# Patient Record
Sex: MALE | Race: WHITE | URBAN - METROPOLITAN AREA
[De-identification: names, ages, dates, MRNs, and addresses within clinical notes are randomized per-mention and may not be internally consistent; named-entity substitution may affect disease eponyms.]

---

## 2017-07-05 ENCOUNTER — FOLLOW UP ESTABLISHED (OUTPATIENT)
Dept: URBAN - METROPOLITAN AREA CLINIC 24 | Facility: CLINIC | Age: 77
End: 2017-07-05
Payer: MEDICARE

## 2017-07-05 PROCEDURE — 92014 COMPRE OPH EXAM EST PT 1/>: CPT | Performed by: OPTOMETRIST

## 2017-07-05 ASSESSMENT — INTRAOCULAR PRESSURE
OD: 11
OS: 12

## 2017-07-05 ASSESSMENT — VISUAL ACUITY
OS: 20/20
OD: 20/20

## 2017-07-05 ASSESSMENT — KERATOMETRY
OS: 40.98
OD: 40.58

## 2018-07-30 ENCOUNTER — FOLLOW UP ESTABLISHED (OUTPATIENT)
Dept: URBAN - METROPOLITAN AREA CLINIC 24 | Facility: CLINIC | Age: 78
End: 2018-07-30
Payer: MEDICARE

## 2018-07-30 PROCEDURE — 92014 COMPRE OPH EXAM EST PT 1/>: CPT | Performed by: OPTOMETRIST

## 2018-07-30 ASSESSMENT — INTRAOCULAR PRESSURE
OS: 14
OD: 12

## 2018-07-30 ASSESSMENT — VISUAL ACUITY
OD: 20/25
OS: 20/20

## 2019-08-01 ENCOUNTER — FOLLOW UP ESTABLISHED (OUTPATIENT)
Dept: URBAN - METROPOLITAN AREA CLINIC 24 | Facility: CLINIC | Age: 79
End: 2019-08-01
Payer: MEDICARE

## 2019-08-01 DIAGNOSIS — H43.813 VITREOUS DEGENERATION, BILATERAL: ICD-10-CM

## 2019-08-01 PROCEDURE — 92134 CPTRZ OPH DX IMG PST SGM RTA: CPT | Performed by: OPTOMETRIST

## 2019-08-01 PROCEDURE — 92014 COMPRE OPH EXAM EST PT 1/>: CPT | Performed by: OPTOMETRIST

## 2019-08-01 ASSESSMENT — INTRAOCULAR PRESSURE
OD: 12
OS: 11

## 2019-08-01 ASSESSMENT — VISUAL ACUITY
OS: 20/20
OD: 20/20

## 2020-10-16 ENCOUNTER — FOLLOW UP ESTABLISHED (OUTPATIENT)
Dept: URBAN - METROPOLITAN AREA CLINIC 24 | Facility: CLINIC | Age: 80
End: 2020-10-16
Payer: MEDICARE

## 2020-10-16 DIAGNOSIS — H25.813 COMBINED FORMS OF AGE-RELATED CATARACT, BILATERAL: Primary | ICD-10-CM

## 2020-10-16 PROCEDURE — 92134 CPTRZ OPH DX IMG PST SGM RTA: CPT | Performed by: OPTOMETRIST

## 2020-10-16 PROCEDURE — 92014 COMPRE OPH EXAM EST PT 1/>: CPT | Performed by: OPTOMETRIST

## 2020-10-16 ASSESSMENT — KERATOMETRY
OD: 40.52
OS: 40.91

## 2020-10-16 ASSESSMENT — VISUAL ACUITY
OD: 20/20
OS: 20/20

## 2020-10-16 ASSESSMENT — INTRAOCULAR PRESSURE
OD: 10
OS: 8

## 2021-11-30 ENCOUNTER — OFFICE VISIT (OUTPATIENT)
Dept: URBAN - METROPOLITAN AREA CLINIC 24 | Facility: CLINIC | Age: 81
End: 2021-11-30
Payer: MEDICARE

## 2021-11-30 DIAGNOSIS — H52.03 HYPERMETROPIA, BILATERAL: ICD-10-CM

## 2021-11-30 DIAGNOSIS — H35.372 PUCKERING OF MACULA, LEFT EYE: ICD-10-CM

## 2021-11-30 PROCEDURE — 92134 CPTRZ OPH DX IMG PST SGM RTA: CPT | Performed by: OPTOMETRIST

## 2021-11-30 PROCEDURE — 99214 OFFICE O/P EST MOD 30 MIN: CPT | Performed by: OPTOMETRIST

## 2021-11-30 ASSESSMENT — VISUAL ACUITY
OD: 20/20
OS: 20/25

## 2021-11-30 ASSESSMENT — KERATOMETRY
OD: 40.51
OS: 41.09

## 2021-11-30 ASSESSMENT — INTRAOCULAR PRESSURE
OS: 8
OD: 8

## 2021-11-30 NOTE — IMPRESSION/PLAN
Impression: Combined forms of age-related cataract, bilateral Plan: No treatment currently recommended due to level of vision / lack of fuctional complaints. Patient will monitor vision changes and contact us with any decrease in vision, will re-evaluate cataract on return visit. -- Pt edu.

## 2021-11-30 NOTE — IMPRESSION/PLAN
Impression: Puckering of macula, left eye Plan: ERM present. No cme, thickening, or complaints of metamorphopsia. Will monitor. Updated mac oct; stable.  minimal.

## 2023-02-09 ENCOUNTER — OFFICE VISIT (OUTPATIENT)
Dept: URBAN - METROPOLITAN AREA CLINIC 24 | Facility: CLINIC | Age: 83
End: 2023-02-09
Payer: MEDICARE

## 2023-02-09 DIAGNOSIS — H35.372 PUCKERING OF MACULA, LEFT EYE: ICD-10-CM

## 2023-02-09 DIAGNOSIS — H25.813 COMBINED FORMS OF AGE-RELATED CATARACT, BILATERAL: Primary | ICD-10-CM

## 2023-02-09 DIAGNOSIS — D31.32 BENIGN NEOPLASM OF LEFT CHOROID: ICD-10-CM

## 2023-02-09 PROCEDURE — 92250 FUNDUS PHOTOGRAPHY W/I&R: CPT | Performed by: OPTOMETRIST

## 2023-02-09 PROCEDURE — 92014 COMPRE OPH EXAM EST PT 1/>: CPT | Performed by: OPTOMETRIST

## 2023-02-09 ASSESSMENT — KERATOMETRY
OD: 40.55
OS: 41.09

## 2023-02-09 ASSESSMENT — INTRAOCULAR PRESSURE
OS: 10
OD: 10

## 2023-02-09 ASSESSMENT — VISUAL ACUITY
OD: 20/20
OS: 20/20

## 2023-02-09 NOTE — IMPRESSION/PLAN
Impression: Combined forms of age-related cataract, bilateral Plan: Again, no treatment currently recommended due to level of vision / lack of fuctional complaints. Patient will monitor vision changes and contact us with any decrease in vision, will re-evaluate cataract on return visit. -- Pt edu.

## 2023-02-09 NOTE — IMPRESSION/PLAN
Impression: Benign neoplasm of left choroid: D31.32.
-- ~1 dd superior arcade (-) Thickness (-) Fluid (-) Pt symptomatic (-) Lipofuscein (-) Margin Irregularity
(+) Drusen Absent
(+) Halo Absence Plan: Low risk. Pt edu. Photodocumentation today. Monitor yearly.

## 2024-02-12 ENCOUNTER — OFFICE VISIT (OUTPATIENT)
Dept: URBAN - METROPOLITAN AREA CLINIC 24 | Facility: CLINIC | Age: 84
End: 2024-02-12
Payer: MEDICARE

## 2024-02-12 DIAGNOSIS — H35.372 PUCKERING OF MACULA, LEFT EYE: ICD-10-CM

## 2024-02-12 DIAGNOSIS — D31.32 BENIGN NEOPLASM OF LEFT CHOROID: ICD-10-CM

## 2024-02-12 DIAGNOSIS — H25.813 COMBINED FORMS OF AGE-RELATED CATARACT, BILATERAL: Primary | ICD-10-CM

## 2024-02-12 PROCEDURE — 92014 COMPRE OPH EXAM EST PT 1/>: CPT | Performed by: OPTOMETRIST

## 2024-02-12 PROCEDURE — 92250 FUNDUS PHOTOGRAPHY W/I&R: CPT | Performed by: OPTOMETRIST

## 2024-02-12 ASSESSMENT — INTRAOCULAR PRESSURE
OS: 10
OD: 12

## 2024-02-12 ASSESSMENT — VISUAL ACUITY
OS: 20/25
OD: 20/30

## 2024-02-12 ASSESSMENT — KERATOMETRY
OS: 41.02
OD: 40.48